# Patient Record
(demographics unavailable — no encounter records)

---

## 2024-10-08 NOTE — REASON FOR VISIT
[Consideration of Curative Therapy] : consideration of curative therapy for [Prostate Cancer] : prostate cancer [Other: _____] : [unfilled]

## 2024-10-08 NOTE — REVIEW OF SYSTEMS
[Diarrhea: Grade 1 - Increase of <4 stools per day over baseline; mild increase in ostomy output compared to baseline] : Diarrhea: Grade 1 - Increase of <4 stools per day over baseline; mild increase in ostomy output compared to baseline [Fatigue: Grade 1 - Fatigue relieved by rest] : Fatigue: Grade 1 - Fatigue relieved by rest [Urinary Incontinence: Grade 0] : Urinary Incontinence: Grade 0  [Urinary Retention: Grade 0] : Urinary Retention: Grade 0 [Urinary Tract Pain: Grade 0] : Urinary Tract Pain: Grade 0 [Urinary Urgency: Grade 0] : Urinary Urgency: Grade 0

## 2024-10-08 NOTE — DISEASE MANAGEMENT
[Clinical] : TNM Stage: c [I] : I [TTNM] : 1c [NTNM] : 0 [MTNM] : 0 [de-identified] : 2000 [de-identified] : 8450 [de-identified] : prostate SN and nodes

## 2024-10-08 NOTE — HISTORY OF PRESENT ILLNESS
[FreeTextEntry1] : 10/1/2024 FX 3/28.  Having nocturia  5-6 times but denies any dysuria or bowel issues.  No other new complaints were offered.  He denied pain and his appetite was good  10/8/2024 FX 8 toyad. Pt reports that he has had 4-5 loos stool this morning.  he has not taken anything yet for this but has increased his hydration.  He was recommended to start Imodium AD per package instruction.  His appetite has been very good and he denied any pain or dysuria.

## 2024-10-15 NOTE — DISEASE MANAGEMENT
[Clinical] : TNM Stage: c [I] : I [TTNM] : 1c [NTNM] : 0 [MTNM] : 0 [de-identified] : 2035 [de-identified] : 4179 [de-identified] : prostate SN and nodes

## 2024-10-24 NOTE — REVIEW OF SYSTEMS
[Fatigue: Grade 1 - Fatigue relieved by rest] : Fatigue: Grade 1 - Fatigue relieved by rest [Urinary Incontinence: Grade 1 - Occasional (e.g., with coughing, sneezing, etc.), pads not indicated] : Urinary Incontinence: Grade 1 - Occasional (e.g., with coughing, sneezing, etc.), pads not indicated [Urinary Urgency: Grade 2 - Limiting instrumental ADL; medical management indicated] : Urinary Urgency: Grade 2 - Limiting instrumental ADL; medical management indicated [Urinary Frequency: Grade 1 - Present] : Urinary Frequency: Grade 1 - Present [Diarrhea: Grade 0] : Diarrhea: Grade 0 [Proctitis: Grade 0] : Proctitis: Grade 0 None

## 2024-10-24 NOTE — DISEASE MANAGEMENT
[Clinical] : TNM Stage: c [I] : I [TTNM] : 1c [NTNM] : 0 [MTNM] : 0 [de-identified] : 1028 [de-identified] : 4282 [de-identified] : prostate SN and nodes

## 2024-10-24 NOTE — HISTORY OF PRESENT ILLNESS
[FreeTextEntry1] : 10/1/2024 FX 3/28.  Having nocturia  5-6 times but denies any dysuria or bowel issues.  No other new complaints were offered.  He denied pain and his appetite was good  10/8/2024 FX 8  Pt reports that he has had 4-5 loos stool this morning.  he has not taken anything yet for this but has increased his hydration.  He was recommended to start Imodium AD per package instruction.  His appetite has been very good and he denied any pain or dysuria.   10/15/2024 FX 13 He is still having some loose stool but not diarrhea.  He is now taking flomax and reports nocturia 1-2 times.  He denies any dysuria and his appetite has been good  10/21/24 FX 17 Pt comes to clinic c/o increased frequency and urgency.  He is currently taking Flomax BID.  He denied dysuria or pain.  His appetite has been good and he had no issues with his bowels this week.

## 2024-10-24 NOTE — PHYSICAL EXAM
[] : no respiratory distress [Respiration, Rhythm And Depth] : normal respiratory rhythm and effort [Exaggerated Use Of Accessory Muscles For Inspiration] : no accessory muscle use [Nondistended] : nondistended [Normal] : oriented to person, place and time, the affect was normal, the mood was normal and not anxious

## 2024-10-24 NOTE — REVIEW OF SYSTEMS
[Fatigue: Grade 1 - Fatigue relieved by rest] : Fatigue: Grade 1 - Fatigue relieved by rest [Urinary Incontinence: Grade 1 - Occasional (e.g., with coughing, sneezing, etc.), pads not indicated] : Urinary Incontinence: Grade 1 - Occasional (e.g., with coughing, sneezing, etc.), pads not indicated [Urinary Urgency: Grade 2 - Limiting instrumental ADL; medical management indicated] : Urinary Urgency: Grade 2 - Limiting instrumental ADL; medical management indicated [Urinary Frequency: Grade 1 - Present] : Urinary Frequency: Grade 1 - Present [Diarrhea: Grade 0] : Diarrhea: Grade 0 [Proctitis: Grade 0] : Proctitis: Grade 0

## 2024-10-24 NOTE — DISEASE MANAGEMENT
[Clinical] : TNM Stage: c [I] : I [TTNM] : 1c [NTNM] : 0 [MTNM] : 0 [de-identified] : 3897 [de-identified] : 3788 [de-identified] : prostate SN and nodes

## 2024-10-31 NOTE — REVIEW OF SYSTEMS
[Constipation: Grade 0] : Constipation: Grade 0 [Diarrhea: Grade 1 - Increase of <4 stools per day over baseline; mild increase in ostomy output compared to baseline] : Diarrhea: Grade 1 - Increase of <4 stools per day over baseline; mild increase in ostomy output compared to baseline [Fatigue: Grade 1 - Fatigue relieved by rest] : Fatigue: Grade 1 - Fatigue relieved by rest [Hematuria: Grade 0] : Hematuria: Grade 0 [Urinary Incontinence: Grade 0] : Urinary Incontinence: Grade 0  [Urinary Retention: Grade 1 - Urinary, suprapubic or intermittent catheter placement not indicated; able to void with some residual] : Urinary Retention: Grade 1 - Urinary, suprapubic or intermittent catheter placement not indicated; able to void with some residual [Urinary Tract Pain: Grade 0] : Urinary Tract Pain: Grade 0 [Urinary Urgency: Grade 1 - Present] : Urinary Urgency: Grade 1 - Present [Urinary Frequency: Grade 1 - Present] : Urinary Frequency: Grade 1 - Present [Dermatitis Radiation: Grade 0] : Dermatitis Radiation: Grade 0

## 2024-11-05 NOTE — HISTORY OF PRESENT ILLNESS
[FreeTextEntry1] : Mr. Muhammad is 75-year-old male with high-risk prostate cancer, Timoteo score 4+4=8 with a PSA of 11.8 ng/ml.  Urologist: Dr. Morris  PSA Trend: 5/2/24 - 11.8 ng/ml  6/10/24 MRI Pelvis/Prostate at [facility] Size: 5.3 x 3.7 x 4.4cm Volume: 45cc - There is an ovoid hypointense nodule measuring 1.1cm and right base posterior lateral measuring 1.1cm. - 5mm vague nodular area right posterior peripheral zone mid gland with questionable slight restricted diffusion although limited evaluation. - No focal abnormalities left peripheral zone. - Unremarkable seminal vesicles and neurovascular bundle regions. No pelvic lymphadenopathy. No gross osseous lesions. No evidence of metastatic disease. - PI-RADS 3.  6/19/24 Prostate Biopsy 1. Left base - adenocarcinoma of prostate, GG4, [place] Score 4+4=8, involving 40% of tissues, 2 out of 2 cores involved by tumor 2. Right mid - adenocarcinoma of prostate, GG1, [place] score 3+3=6, involving 5% of tissue, 1 out of 2 cores involved by tumor  7/15/24 PET[facility] at [facility] demonstrated no hypermetabolic lymphadenopathy or osseous lesions.  8/9/24 Mr. Muhammad presents today to discuss treatment with radiation as referred by Dr. Morris. 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 Patient denies any dysuria, hematuria, frequency, and bladder issues. He c/o occasional urgency. Patient received his first dose of Eligard on 8/7/24 with Dr. Morris. EPIC=7 AUA=5  Eligard and spacer were discussed by Dr. Morris with the patient.  10/1/2024 FX 3/28. Having nocturia 5-6 times but denies any dysuria or bowel issues. No other new complaints were offered. He denied pain and his appetite was good  10/8/2024 FX 8 Pt reports that he has had 4-5 loos stool this morning. he has not taken anything yet for this but has increased his hydration. He was recommended to start Imodium AD per package instruction. His appetite has been very good and he denied any pain or dysuria.  10/15/2024 FX 13 He is still having some loose stool but not diarrhea. He is now taking flomax and reports nocturia 1-2 times. He denies any dysuria and his appetite has been good  10/21/24 FX 17 Pt comes to clinic c/o increased frequency and urgency. He is currently taking Flomax BID. He denied dysuria or pain. His appetite has been good and he had no issues with his bowels this week.  10/31/2024 Mr. Muhammad presents today for an OTV, completed 25/28 fractions to a dose of 6250 cGy. He is feeling well today. He experiences nocturia x 2. He needs a refill on Flomax BID and Oxybutin, which he states is helping. He still c/o urinary urgency and frequency. He has fatigue and sleeps a lot. He does have some diarrhea and once he has it, he takes Imodium and that usually lasts for 3 days.

## 2024-11-05 NOTE — HISTORY OF PRESENT ILLNESS
[FreeTextEntry1] : Mr. Muhammad is 75-year-old male with high-risk prostate cancer, Timoteo score 4+4=8 with a PSA of 11.8 ng/ml.  Urologist: Dr. Morris  PSA Trend: 5/2/24 - 11.8 ng/ml  6/10/24 MRI Pelvis/Prostate at [facility] Size: 5.3 x 3.7 x 4.4cm Volume: 45cc - There is an ovoid hypointense nodule measuring 1.1cm and right base posterior lateral measuring 1.1cm. - 5mm vague nodular area right posterior peripheral zone mid gland with questionable slight restricted diffusion although limited evaluation. - No focal abnormalities left peripheral zone. - Unremarkable seminal vesicles and neurovascular bundle regions. No pelvic lymphadenopathy. No gross osseous lesions. No evidence of metastatic disease. - PI-RADS 3.  6/19/24 Prostate Biopsy 1. Left base - adenocarcinoma of prostate, GG4, [place] Score 4+4=8, involving 40% of tissues, 2 out of 2 cores involved by tumor 2. Right mid - adenocarcinoma of prostate, GG1, [place] score 3+3=6, involving 5% of tissue, 1 out of 2 cores involved by tumor  7/15/24 PET[facility] at [facility] demonstrated no hypermetabolic lymphadenopathy or osseous lesions.  8/9/24 Mr. Muhammad presents today to discuss treatment with radiation as referred by Dr. Morris. 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 Patient denies any dysuria, hematuria, frequency, and bladder issues. He c/o occasional urgency. Patient received his first dose of Eligard on 8/7/24 with Dr. Morris. EPIC=7 AUA=5  Eligard and spacer were discussed by Dr. Morris with the patient.  10/1/2024 FX 3/28. Having nocturia 5-6 times but denies any dysuria or bowel issues. No other new complaints were offered. He denied pain and his appetite was good  10/8/2024 FX 8 Pt reports that he has had 4-5 loos stool this morning. he has not taken anything yet for this but has increased his hydration. He was recommended to start Imodium AD per package instruction. His appetite has been very good and he denied any pain or dysuria.  10/15/2024 FX 13 He is still having some loose stool but not diarrhea. He is now taking flomax and reports nocturia 1-2 times. He denies any dysuria and his appetite has been good  10/21/24 FX 17 Pt comes to clinic c/o increased frequency and urgency. He is currently taking Flomax BID. He denied dysuria or pain. His appetite has been good and he had no issues with his bowels this week.  10/31/2024 Mr. Muhammad presents today for an OTV, completed 25/28 fractions to a dose of 6250 cGy. He is feeling well today. He experiences nocturia x 2. He needs a refill on Flomax BID and Oxybutin, which he states is helping. He still c/o urinary urgency and frequency. He has fatigue and sleeps a lot. He does have some diarrhea and once he has it, he takes Imodium and that usually lasts for 3 days.

## 2024-11-05 NOTE — DISEASE MANAGEMENT
[Clinical] : TNM Stage: c [I] : I [TTNM] : 1c [NTNM] : 0 [MTNM] : 0 [de-identified] : 7652 [de-identified] : 0791 [de-identified] : prostate SN and nodes

## 2024-11-05 NOTE — DISEASE MANAGEMENT
[Clinical] : TNM Stage: c [I] : I [TTNM] : 1c [NTNM] : 0 [MTNM] : 0 [de-identified] : 6607 [de-identified] : 8135 [de-identified] : prostate SN and nodes

## 2024-11-05 NOTE — DISEASE MANAGEMENT
[Clinical] : TNM Stage: c [I] : I [TTNM] : 1c [NTNM] : 0 [MTNM] : 0 [de-identified] : 0481 [de-identified] : 3075 [de-identified] : prostate SN and nodes

## 2024-11-05 NOTE — HISTORY OF PRESENT ILLNESS
[FreeTextEntry1] : Mr. Muhammad is 75-year-old male with high-risk prostate cancer, Timoteo score 4+4=8 with a PSA of 11.8 ng/ml.  Urologist: Dr. Morris  PSA Trend: 5/2/24 - 11.8 ng/ml  6/10/24 MRI Pelvis/Prostate at [facility] Size: 5.3 x 3.7 x 4.4cm Volume: 45cc - There is an ovoid hypointense nodule measuring 1.1cm and right base posterior lateral measuring 1.1cm. - 5mm vague nodular area right posterior peripheral zone mid gland with questionable slight restricted diffusion although limited evaluation. - No focal abnormalities left peripheral zone. - Unremarkable seminal vesicles and neurovascular bundle regions. No pelvic lymphadenopathy. No gross osseous lesions. No evidence of metastatic disease. - PI-RADS 3.  6/19/24 Prostate Biopsy 1. Left base - adenocarcinoma of prostate, GG4, [place] Score 4+4=8, involving 40% of tissues, 2 out of 2 cores involved by tumor 2. Right mid - adenocarcinoma of prostate, GG1, [place] score 3+3=6, involving 5% of tissue, 1 out of 2 cores involved by tumor  7/15/24 PET/CT at [facility] demonstrated no hypermetabolic lymphadenopathy or osseous lesions.  8/9/24 Mr. Muhammad presents today to discuss treatment with radiation as referred by Dr. Morris. 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 Patient denies any dysuria, hematuria, frequency, and bladder issues. He c/o occasional urgency. Patient received his first dose of Eligard on 8/7/24 with Dr. Morris. EPIC=7 AUA=5  Eligard and spacer were discussed by Dr. Morris with the patient.  10/1/2024 FX 3/28. Having nocturia 5-6 times but denies any dysuria or bowel issues. No other new complaints were offered. He denied pain and his appetite was good  10/8/2024 FX 8 Pt reports that he has had 4-5 loos stool this morning. he has not taken anything yet for this but has increased his hydration. He was recommended to start Imodium AD per package instruction. His appetite has been very good and he denied any pain or dysuria.  10/15/2024 FX 13 He is still having some loose stool but not diarrhea. He is now taking flomax and reports nocturia 1-2 times. He denies any dysuria and his appetite has been good  10/21/24 FX 17 Pt comes to clinic c/o increased frequency and urgency. He is currently taking Flomax BID. He denied dysuria or pain. His appetite has been good and he had no issues with his bowels this week.  10/31/2024 Mr. Muhammad presents today for an OTV, completed 25/28 fractions to a dose of 6250 cGy. He is feeling well today. He experiences nocturia x 2. He needs a refill on Flomax BID and Oxybutin, which he states is helping. He still c/o urinary urgency and frequency. He has fatigue and sleeps a lot. He does have some diarrhea and once he has it, he takes Imodium and that usually lasts for 3 days.  11/5/24 Mr. Muhammad presents today for an OTV, completed  28/28 fractions to a dose of 7000 cGy. He c/o urinary urgency and frequency, nocturia x 5 and diarrhea. He states that he takes Imodium ocasionally for the diarrhea. He also takes Oxybutynin daily and Flomax BID which have been helpful with his symptoms.

## 2024-11-18 NOTE — DISEASE MANAGEMENT
[Clinical] : TNM Stage: c [TTNM] : 1c [NTNM] : 0 [MTNM] : 0 [I] : I [de-identified] : 0678 [de-identified] : 3326 [de-identified] : prostate SN and nodes completed in 28 Fxs on 11/5/24

## 2024-11-18 NOTE — HISTORY OF PRESENT ILLNESS
[FreeTextEntry1] : Mr. Muhammad is 75-year-old male with high-risk prostate cancer, Timoteo score 4+4=8 with a PSA of 11.8 ng/ml.  Urologist: Dr. Morris  PSA Trend: 5/2/24 - 11.8 ng/ml  6/10/24 MRI Pelvis/Prostate at [facility] Size: 5.3 x 3.7 x 4.4cm Volume: 45cc - There is an ovoid hypointense nodule measuring 1.1cm and right base posterior lateral measuring 1.1cm. - 5mm vague nodular area right posterior peripheral zone mid gland with questionable slight restricted diffusion although limited evaluation. - No focal abnormalities left peripheral zone. - Unremarkable seminal vesicles and neurovascular bundle regions. No pelvic lymphadenopathy. No gross osseous lesions. No evidence of metastatic disease. - PI-RADS 3.  6/19/24 Prostate Biopsy 1. Left base - adenocarcinoma of prostate, GG4, [place] Score 4+4=8, involving 40% of tissues, 2 out of 2 cores involved by tumor 2. Right mid - adenocarcinoma of prostate, GG1, [place] score 3+3=6, involving 5% of tissue, 1 out of 2 cores involved by tumor  7/15/24 PET/CT at [facility] demonstrated no hypermetabolic lymphadenopathy or osseous lesions.  8/9/24 Mr. Muhammad presents today to discuss treatment with radiation as referred by Dr. Morris. 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 Patient denies any dysuria, hematuria, frequency, and bladder issues. He c/o occasional urgency. Patient received his first dose of Eligard on 8/7/24 with Dr. Morris. EPIC=7 AUA=5  Eligard and spacer were discussed by Dr. Morris with the patient.  12/3/2024 PTE Mr Muhammad returns today for his PTE.  He completed 28 Fxs to 70 GY on 11/5/24.  His PSA was***

## 2024-12-03 NOTE — HISTORY OF PRESENT ILLNESS
[FreeTextEntry1] : Mr. Muhammad is 75-year-old male with high-risk prostate cancer, Timoteo score 4+4=8 with a PSA of 11.8 ng/ml.  Urologist: Dr. Morris  PSA Trend: 11/25/24 0.05ng/ml 5/2/24 - 11.8 ng/ml  6/10/24 MRI Pelvis/Prostate at [facility] Size: 5.3 x 3.7 x 4.4cm Volume: 45cc - There is an ovoid hypointense nodule measuring 1.1cm and right base posterior lateral measuring 1.1cm. - 5mm vague nodular area right posterior peripheral zone mid gland with questionable slight restricted diffusion although limited evaluation. - No focal abnormalities left peripheral zone. - Unremarkable seminal vesicles and neurovascular bundle regions. No pelvic lymphadenopathy. No gross osseous lesions. No evidence of metastatic disease. - PI-RADS 3.  6/19/24 Prostate Biopsy 1. Left base - adenocarcinoma of prostate, GG4, Timoteo Score 4+4=8, involving 40% of tissues, 2 out of 2 cores involved by tumor 2. Right mid - adenocarcinoma of prostate, GG1, Timoteo score 3+3=6, involving 5% of tissue, 1 out of 2 cores involved by tumor  7/15/24 PET/CT at [facility] demonstrated no hypermetabolic lymphadenopathy or osseous lesions.  8/9/24 Mr. Muhammad presents today to discuss treatment with radiation as referred by Dr. Morris. 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 Patient denies any dysuria, hematuria, frequency, and bladder issues. He c/o occasional urgency. Patient received his first dose of Eligard on 8/7/24 with Dr. Morris. EPIC=7 AUA=5  Eligard and spacer were discussed by Dr. Morris with the patient.  12/3/2024 PTE Mr Muhammad returns today for his PTE.  He completed 28 Fxs to 70 GY on 11/5/24.  His PSA was 0.05 on 11/15/24.  He has been feeling well but is stilll having nocturia x3.  He continues on Flomax and ditropan.  He also has some frequency of BM and at times has nausea while moving his bowels.  He denied urgency or dysuira.

## 2024-12-03 NOTE — DISEASE MANAGEMENT
[TTNM] : 1c [NTNM] : 0 [MTNM] : 0 [de-identified] : 0689 [de-identified] : 2103 [de-identified] : prostate SN and nodes completed in 28 Fxs on 11/5/24

## 2024-12-03 NOTE — REVIEW OF SYSTEMS
[Fatigue] : fatigue [Diarrhea] : diarrhea [Nocturia] : nocturia [Urinary Frequency] : no urinary frequency

## 2024-12-03 NOTE — HISTORY OF PRESENT ILLNESS
[FreeTextEntry1] : Mr. Muhammad is 75-year-old male with high-risk prostate cancer, Timoteo score 4+4=8 with a PSA of 11.8 ng/ml.  Urologist: Dr. Morris  PSA Trend: 11/25/24 0.05ng/ml 5/2/24 - 11.8 ng/ml  6/10/24 MRI Pelvis/Prostate at [facility] Size: 5.3 x 3.7 x 4.4cm Volume: 45cc - There is an ovoid hypointense nodule measuring 1.1cm and right base posterior lateral measuring 1.1cm. - 5mm vague nodular area right posterior peripheral zone mid gland with questionable slight restricted diffusion although limited evaluation. - No focal abnormalities left peripheral zone. - Unremarkable seminal vesicles and neurovascular bundle regions. No pelvic lymphadenopathy. No gross osseous lesions. No evidence of metastatic disease. - PI-RADS 3.  6/19/24 Prostate Biopsy 1. Left base - adenocarcinoma of prostate, GG4, Timoteo Score 4+4=8, involving 40% of tissues, 2 out of 2 cores involved by tumor 2. Right mid - adenocarcinoma of prostate, GG1, Timoteo score 3+3=6, involving 5% of tissue, 1 out of 2 cores involved by tumor  7/15/24 PET/CT at [facility] demonstrated no hypermetabolic lymphadenopathy or osseous lesions.  8/9/24 Mr. Muhammad presents today to discuss treatment with radiation as referred by Dr. Morris. 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 Patient denies any dysuria, hematuria, frequency, and bladder issues. He c/o occasional urgency. Patient received his first dose of Eligard on 8/7/24 with Dr. Morris. EPIC=7 AUA=5  Eligard and spacer were discussed by Dr. Morris with the patient.  12/3/2024 PTE Mr Muhammad returns today for his PTE.  He completed 28 Fxs to 70 GY on 11/5/24.  His PSA was 0.05 on 11/15/24.  He has been feeling well but is stilll having nocturia x3.  He continues on Flomax and ditropan.  He also has some frequency of BM and at times has nausea while moving his bowels.  He denied urgency or dysuira.

## 2024-12-03 NOTE — DISEASE MANAGEMENT
[TTNM] : 1c [NTNM] : 0 [MTNM] : 0 [de-identified] : 9756 [de-identified] : 2475 [de-identified] : prostate SN and nodes completed in 28 Fxs on 11/5/24

## 2025-02-05 NOTE — HISTORY OF PRESENT ILLNESS
[FreeTextEntry1] : Kota Shea 75 year old man with history of prostate cancer s.p surgery is here in the sleep center to address sleep apnea. Patient has been on cpap for sleep apnea and is doing well.  symptoms on cpap - Patient is not sleepy with [place] sleepiness score of 4.  Patient  does not snore.  Patient's bedtime is around 9 PM wakes up in the morning around 8 AM.   He feels rested when he wakes up.  Patient drinks 2 cups of coffee during the daytime. Patient does not have any headaches or nocturia. He is not sleepy while driving.

## 2025-06-13 NOTE — HISTORY OF PRESENT ILLNESS
[FreeTextEntry1] : Mr. Muhammad is 75-year-old male with high-risk prostate cancer, Timoteo score 4+4=8 with a PSA of 11.8 ng/ml.  Urologist: Dr. Morris  PSA Trend: 5/11/2025 0.05 11/25/24 0.05ng/ml 5/2/24 - 11.8 ng/ml  6/10/24 MRI Pelvis/Prostate at [facility] Size: 5.3 x 3.7 x 4.4cm Volume: 45cc - There is an ovoid hypointense nodule measuring 1.1cm and right base posterior lateral measuring 1.1cm. - 5mm vague nodular area right posterior peripheral zone mid gland with questionable slight restricted diffusion although limited evaluation. - No focal abnormalities left peripheral zone. - Unremarkable seminal vesicles and neurovascular bundle regions. No pelvic lymphadenopathy. No gross osseous lesions. No evidence of metastatic disease. - PI-RADS 3.  6/19/24 Prostate Biopsy 1. Left base - adenocarcinoma of prostate, GG4, [place] Score 4+4=8, involving 40% of tissues, 2 out of 2 cores involved by tumor 2. Right mid - adenocarcinoma of prostate, GG1, Timoteo score 3+3=6, involving 5% of tissue, 1 out of 2 cores involved by tumor  7/15/24 PET/CT at [facility] demonstrated no hypermetabolic lymphadenopathy or osseous lesions.  8/9/24 Mr. Muhammad presents today to discuss treatment with radiation as referred by Dr. Morris. 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 Patient denies any dysuria, hematuria, frequency, and bladder issues. He c/o occasional urgency. Patient received his first dose of Eligard on 8/7/24 with Dr. Morris. EPIC=7 AUA=5  Eligard and spacer were discussed by Dr. Morris with the patient.  12/3/2024 PTE Mr. Muhammad returns today for his PTE. He completed 28 Fxs to 70 GY on 11/5/24. His PSA was 0.05 on 11/15/24. He has been feeling well but is still having nocturia x3. He continues on Flomax and Ditropan. He also has some frequency of BM and at times has nausea while moving his bowels. He denied urgency or dysuria.  6/13/2025 Mr. Muhammad presents today for a follow up.  he had a recent PSA done on 6/11/2025. He saw Dr. Morris in 2/2025 and received his 2nd Eligard injection. HE states that he is able to hold his urine much better now. He does still have some urgency and frequency. Nocturia x2-4. He denies any pain, discomfort, hematuria and his stream is good. He continues on the Oxybutynin.

## 2025-06-13 NOTE — HISTORY OF PRESENT ILLNESS
[FreeTextEntry1] : Mr. Muhammad is 75-year-old male with high-risk prostate cancer, Timoteo score 4+4=8 with a PSA of 11.8 ng/ml.  Urologist: Dr. Morris  PSA Trend: 5/11/2025 0.05 11/25/24 0.05ng/ml 5/2/24 - 11.8 ng/ml  6/10/24 MRI Pelvis/Prostate at [facility] Size: 5.3 x 3.7 x 4.4cm Volume: 45cc - There is an ovoid hypointense nodule measuring 1.1cm and right base posterior lateral measuring 1.1cm. - 5mm vague nodular area right posterior peripheral zone mid gland with questionable slight restricted diffusion although limited evaluation. - No focal abnormalities left peripheral zone. - Unremarkable seminal vesicles and neurovascular bundle regions. No pelvic lymphadenopathy. No gross osseous lesions. No evidence of metastatic disease. - PI-RADS 3.  6/19/24 Prostate Biopsy 1. Left base - adenocarcinoma of prostate, GG4, [place] Score 4+4=8, involving 40% of tissues, 2 out of 2 cores involved by tumor 2. Right mid - adenocarcinoma of prostate, GG1, Timoteo score 3+3=6, involving 5% of tissue, 1 out of 2 cores involved by tumor  7/15/24 PET/CT at [facility] demonstrated no hypermetabolic lymphadenopathy or osseous lesions.  8/9/24 Mr. Muhammad presents today to discuss treatment with radiation as referred by Dr. Morris. 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 Patient denies any dysuria, hematuria, frequency, and bladder issues. He c/o occasional urgency. Patient received his first dose of Eligard on 8/7/24 with Dr. Morris. EPIC=7 AUA=5  Eligard and spacer were discussed by Dr. Morris with the patient.  12/3/2024 PTE Mr. Muhammad returns today for his PTE. He completed 28 Fxs to 70 GY on 11/5/24. His PSA was 0.05 on 11/15/24. He has been feeling well but is still having nocturia x3. He continues on Flomax and Ditropan. He also has some frequency of BM and at times has nausea while moving his bowels. He denied urgency or dysuria.  6/13/2025 Mr. Muhammad presents today for a follow up.  he had a recent PSA done on 6/11/2025. He saw Dr. Morris in 2/2025 and received his 2nd Eligard injection. HE states that he is able to hold his urine much better now. He does still have some urgency and frequency. Nocturia x2-4. He denies any pain, discomfort, hematuria and his stream is good. He continues on the Oxybutynin.

## 2025-06-13 NOTE — REVIEW OF SYSTEMS
[Constipation: Grade 0] : Constipation: Grade 0 [Diarrhea: Grade 0] : Diarrhea: Grade 0 [Fatigue: Grade 0] : Fatigue: Grade 0 [Hematuria: Grade 0] : Hematuria: Grade 0 [Urinary Incontinence: Grade 1 - Occasional (e.g., with coughing, sneezing, etc.), pads not indicated] : Urinary Incontinence: Grade 1 - Occasional (e.g., with coughing, sneezing, etc.), pads not indicated [Urinary Retention: Grade 0] : Urinary Retention: Grade 0 [Urinary Tract Pain: Grade 0] : Urinary Tract Pain: Grade 0 [Urinary Urgency: Grade 1 - Present] : Urinary Urgency: Grade 1 - Present [Urinary Frequency: Grade 1 - Present] : Urinary Frequency: Grade 1 - Present [Dermatitis Radiation: Grade 0] : Dermatitis Radiation: Grade 0

## 2025-06-13 NOTE — DISEASE MANAGEMENT
[Clinical] : TNM Stage: c [I] : I [TTNM] : 1c [NTNM] : 0 [MTNM] : 0 [de-identified] : [id number] [de-identified] : [id number] [de-identified] : prostate SN and nodes completed in 28 Fxs on 11/5/24

## 2025-06-13 NOTE — DISEASE MANAGEMENT
[Clinical] : TNM Stage: c [I] : I [TTNM] : 1c [NTNM] : 0 [MTNM] : 0 [de-identified] : [id number] [de-identified] : [id number] [de-identified] : prostate SN and nodes completed in 28 Fxs on 11/5/24